# Patient Record
Sex: FEMALE | Race: WHITE | NOT HISPANIC OR LATINO | Employment: UNEMPLOYED | URBAN - METROPOLITAN AREA
[De-identification: names, ages, dates, MRNs, and addresses within clinical notes are randomized per-mention and may not be internally consistent; named-entity substitution may affect disease eponyms.]

---

## 2023-05-19 ENCOUNTER — HOSPITAL ENCOUNTER (EMERGENCY)
Facility: HOSPITAL | Age: 23
Discharge: HOME | End: 2023-05-19
Attending: EMERGENCY MEDICINE
Payer: OTHER GOVERNMENT

## 2023-05-19 VITALS
RESPIRATION RATE: 22 BRPM | HEART RATE: 88 BPM | OXYGEN SATURATION: 100 % | WEIGHT: 155 LBS | TEMPERATURE: 99 F | DIASTOLIC BLOOD PRESSURE: 59 MMHG | SYSTOLIC BLOOD PRESSURE: 104 MMHG

## 2023-05-19 DIAGNOSIS — K80.20 CALCULUS OF GALLBLADDER WITHOUT CHOLECYSTITIS WITHOUT OBSTRUCTION: Primary | ICD-10-CM

## 2023-05-19 DIAGNOSIS — R10.10 UPPER ABDOMINAL PAIN: ICD-10-CM

## 2023-05-19 LAB
ALBUMIN SERPL BCP-MCNC: 4.3 G/DL (ref 3.5–5.2)
ALP SERPL-CCNC: 108 U/L (ref 55–135)
ALT SERPL W/O P-5'-P-CCNC: 33 U/L (ref 10–44)
ANION GAP SERPL CALC-SCNC: 11 MMOL/L (ref 8–16)
AST SERPL-CCNC: 77 U/L (ref 10–40)
B-HCG UR QL: NEGATIVE
BASOPHILS # BLD AUTO: 0.03 K/UL (ref 0–0.2)
BASOPHILS NFR BLD: 0.2 % (ref 0–1.9)
BILIRUB SERPL-MCNC: 0.6 MG/DL (ref 0.1–1)
BILIRUB UR QL STRIP: NEGATIVE
BUN SERPL-MCNC: 15 MG/DL (ref 6–20)
CALCIUM SERPL-MCNC: 9.2 MG/DL (ref 8.7–10.5)
CHLORIDE SERPL-SCNC: 107 MMOL/L (ref 95–110)
CLARITY UR: CLEAR
CO2 SERPL-SCNC: 23 MMOL/L (ref 23–29)
COLOR UR: YELLOW
CREAT SERPL-MCNC: 0.9 MG/DL (ref 0.5–1.4)
CTP QC/QA: YES
DIFFERENTIAL METHOD: ABNORMAL
EOSINOPHIL # BLD AUTO: 0.1 K/UL (ref 0–0.5)
EOSINOPHIL NFR BLD: 0.7 % (ref 0–8)
ERYTHROCYTE [DISTWIDTH] IN BLOOD BY AUTOMATED COUNT: 12.9 % (ref 11.5–14.5)
EST. GFR  (NO RACE VARIABLE): >60 ML/MIN/1.73 M^2
GLUCOSE SERPL-MCNC: 97 MG/DL (ref 70–110)
GLUCOSE UR QL STRIP: NEGATIVE
HCT VFR BLD AUTO: 40 % (ref 37–48.5)
HGB BLD-MCNC: 13.2 G/DL (ref 12–16)
HGB UR QL STRIP: NEGATIVE
IMM GRANULOCYTES # BLD AUTO: 0.03 K/UL (ref 0–0.04)
IMM GRANULOCYTES NFR BLD AUTO: 0.2 % (ref 0–0.5)
KETONES UR QL STRIP: NEGATIVE
LEUKOCYTE ESTERASE UR QL STRIP: NEGATIVE
LIPASE SERPL-CCNC: 44 U/L (ref 4–60)
LYMPHOCYTES # BLD AUTO: 3.4 K/UL (ref 1–4.8)
LYMPHOCYTES NFR BLD: 27.3 % (ref 18–48)
MCH RBC QN AUTO: 28.9 PG (ref 27–31)
MCHC RBC AUTO-ENTMCNC: 33 G/DL (ref 32–36)
MCV RBC AUTO: 88 FL (ref 82–98)
MONOCYTES # BLD AUTO: 0.6 K/UL (ref 0.3–1)
MONOCYTES NFR BLD: 4.8 % (ref 4–15)
NEUTROPHILS # BLD AUTO: 8.2 K/UL (ref 1.8–7.7)
NEUTROPHILS NFR BLD: 66.8 % (ref 38–73)
NITRITE UR QL STRIP: NEGATIVE
NRBC BLD-RTO: 0 /100 WBC
PH UR STRIP: 8 [PH] (ref 5–8)
PLATELET # BLD AUTO: 237 K/UL (ref 150–450)
PMV BLD AUTO: 10.2 FL (ref 9.2–12.9)
POTASSIUM SERPL-SCNC: 3.3 MMOL/L (ref 3.5–5.1)
PROT SERPL-MCNC: 7.3 G/DL (ref 6–8.4)
PROT UR QL STRIP: ABNORMAL
RBC # BLD AUTO: 4.56 M/UL (ref 4–5.4)
SODIUM SERPL-SCNC: 141 MMOL/L (ref 136–145)
SP GR UR STRIP: 1.02 (ref 1–1.03)
URN SPEC COLLECT METH UR: ABNORMAL
UROBILINOGEN UR STRIP-ACNC: ABNORMAL EU/DL
WBC # BLD AUTO: 12.29 K/UL (ref 3.9–12.7)

## 2023-05-19 PROCEDURE — 80053 COMPREHEN METABOLIC PANEL: CPT | Performed by: NURSE PRACTITIONER

## 2023-05-19 PROCEDURE — 81003 URINALYSIS AUTO W/O SCOPE: CPT | Performed by: NURSE PRACTITIONER

## 2023-05-19 PROCEDURE — 83690 ASSAY OF LIPASE: CPT | Performed by: NURSE PRACTITIONER

## 2023-05-19 PROCEDURE — 81025 URINE PREGNANCY TEST: CPT | Performed by: NURSE PRACTITIONER

## 2023-05-19 PROCEDURE — 85025 COMPLETE CBC W/AUTO DIFF WBC: CPT | Performed by: NURSE PRACTITIONER

## 2023-05-19 PROCEDURE — 99284 EMERGENCY DEPT VISIT MOD MDM: CPT | Mod: 25

## 2023-05-19 PROCEDURE — 25000003 PHARM REV CODE 250: Performed by: NURSE PRACTITIONER

## 2023-05-19 RX ORDER — POTASSIUM CHLORIDE 20 MEQ/1
20 TABLET, EXTENDED RELEASE ORAL
Status: COMPLETED | OUTPATIENT
Start: 2023-05-19 | End: 2023-05-19

## 2023-05-19 RX ORDER — OXYCODONE AND ACETAMINOPHEN 5; 325 MG/1; MG/1
1 TABLET ORAL EVERY 6 HOURS PRN
Qty: 8 TABLET | Refills: 0 | Status: SHIPPED | OUTPATIENT
Start: 2023-05-19 | End: 2023-05-21

## 2023-05-19 RX ORDER — ONDANSETRON 4 MG/1
4 TABLET, ORALLY DISINTEGRATING ORAL EVERY 8 HOURS PRN
Qty: 9 TABLET | Refills: 0 | Status: SHIPPED | OUTPATIENT
Start: 2023-05-19 | End: 2023-05-22

## 2023-05-19 RX ADMIN — POTASSIUM CHLORIDE 20 MEQ: 1500 TABLET, EXTENDED RELEASE ORAL at 03:05

## 2023-05-19 NOTE — ED PROVIDER NOTES
Encounter Date: 5/19/2023    SCRIBE #1 NOTE: I, Steven Lin, am scribing for, and in the presence of,  Briana Condon NP. I have scribed the following portions of the note - Other sections scribed: HPI, ROS, Physical Exam.       Chief Complaint   Patient presents with    Abdominal Pain     Pt states that she woke up with abd pain and is nauseated.  Pt states that its all 4 quads that are hurting.      Carl Mills is a 23 y.o. female who presents to the ED with abdominal pain, onset 30 minutes ago. The patient reports she was laying in bed when the pain started suddenly and she became diaphoretic. Patient reports she could barely move due to the severity. She denies GI history. Last BM just prior to arrival. She denies N/V/D or fever.     The history is provided by the patient. No  was used.   Review of patient's allergies indicates:  Not on File  History reviewed. No pertinent past medical history.  History reviewed. No pertinent surgical history.  History reviewed. No pertinent family history.     Review of Systems   Constitutional:  Positive for diaphoresis.   Gastrointestinal:  Positive for abdominal pain. Negative for diarrhea, nausea and vomiting.   All other systems reviewed and are negative.    Physical Exam     Initial Vitals   BP Pulse Resp Temp SpO2   05/19/23 1429 05/19/23 1429 05/19/23 1429 05/19/23 1436 05/19/23 1429   (!) 104/59 88 (!) 22 98.6 °F (37 °C) 100 %      MAP       --                Physical Exam    Constitutional: She appears well-developed and well-nourished.   HENT:   Head: Normocephalic and atraumatic.   Eyes: Conjunctivae and EOM are normal. Pupils are equal, round, and reactive to light.   Neck: Neck supple.   Normal range of motion.  Cardiovascular:  Normal rate and regular rhythm.           Pulmonary/Chest: Breath sounds normal. No respiratory distress.   Abdominal: Abdomen is soft. There is abdominal tenderness in the epigastric area and periumbilical area.    No right CVA tenderness.  No left CVA tenderness. There is no rebound and no guarding.   Musculoskeletal:         General: Normal range of motion.      Cervical back: Normal range of motion and neck supple.     Neurological: She is alert and oriented to person, place, and time.   Skin: Skin is warm and dry.   Psychiatric: She has a normal mood and affect. Thought content normal.     ED Course   Procedures  Labs Reviewed   URINALYSIS, REFLEX TO URINE CULTURE - Abnormal; Notable for the following components:       Result Value    Protein, UA Trace (*)     Urobilinogen, UA 2.0-3.0 (*)     All other components within normal limits    Narrative:     Specimen Source->Urine   COMPREHENSIVE METABOLIC PANEL - Abnormal; Notable for the following components:    Potassium 3.3 (*)     AST 77 (*)     All other components within normal limits   CBC W/ AUTO DIFFERENTIAL - Abnormal; Notable for the following components:    Gran # (ANC) 8.2 (*)     All other components within normal limits   LIPASE   POCT URINE PREGNANCY          Imaging Results              US Abdomen Limited (Gallbladder) (Final result)  Result time 05/19/23 16:34:49      Final result by Gregory Cuellar MD (05/19/23 16:34:49)                   Impression:      Multiple gallstones.  No gallbladder wall thickening or pericholecystic fluid.  Negative sonographic Aldrich sign.      Electronically signed by: Gregory Cuellar MD  Date:    05/19/2023  Time:    16:34               Narrative:    EXAMINATION:  US ABDOMEN LIMITED    CLINICAL HISTORY:  Upper abdominal pain, unspecified    TECHNIQUE:  Limited ultrasound of the right upper quadrant of the abdomen (including pancreas, gallbladder, common bile duct, and right kidney) was performed.    COMPARISON:  None.    FINDINGS:  The visualized portions of the pancreas, abdominal aorta, and IVC are unremarkable.    Gallbladder: Multiple gallstones.  No gallbladder wall thickening or pericholecystic fluid..  Negative sonographic  Aldrich's sign.    Biliary system: The common duct is within normal limits, measuring 2 mm.  No intrahepatic ductal dilatation.    No right-sided hydronephrosis.                                       Medications   potassium chloride SA CR tablet 20 mEq (20 mEq Oral Given 5/19/23 1548)     Medical Decision Making:   Initial Assessment:   Carl Mills is a 23 y.o. female who presents to the ED with abdominal pain x 30 minutes.  Differential Diagnosis:   Differential Diagnosis includes, but is not limited to:  AAA, aortic dissection, mesenteric ischemia, perforated viscous, MI/ACS, SBO/volvulus, incarcerated/strangulated hernia, intussusception, ileus, appendicitis, cholecystitis, cholangitis, diverticulitis, esophagitis, hepatitis, nephrolithiasis, pancreatitis, gastroenteritis, colitis, IBD/IBS, biliary colic, GERD, PUD, constipation, UTI/pyelonephritis,  disorder.      Clinical Tests:   Lab Tests: Ordered and Reviewed  Radiological Study: Ordered and Reviewed        Scribe Attestation:   Scribe #1: I performed the above scribed service and the documentation accurately describes the services I performed. I attest to the accuracy of the note.      ED Course as of 05/19/23 1721   Fri May 19, 2023   1656 FINDINGS:  The visualized portions of the pancreas, abdominal aorta, and IVC are unremarkable.     Gallbladder: Multiple gallstones.  No gallbladder wall thickening or pericholecystic fluid..  Negative sonographic Aldrich's sign.     Biliary system: The common duct is within normal limits, measuring 2 mm.  No intrahepatic ductal dilatation.     No right-sided hydronephrosis.     Impression:     Multiple gallstones.  No gallbladder wall thickening or pericholecystic fluid.  Negative sonographic Aldrich sign [DT]   1656 CBC, chemistry, lipase, UPT, urinary analysis have been reviewed.  The potassium was slightly decreased at 3.3 therefore potassium supplement was given.  The ultrasound has been reviewed with multiple  gallstones.  There is no evidence of acute cholecystitis at this time.  Patient will be referred to General surgery and has been given strict return precautions. [DT]      ED Course User Index  [DT] Briana Condon NP                 Clinical Impression:   Final diagnoses:  [R10.10] Upper abdominal pain  [K80.20] Calculus of gallbladder without cholecystitis without obstruction (Primary)        ED Disposition Condition    Discharge Stable          ED Prescriptions       Medication Sig Dispense Start Date End Date Auth. Provider    oxyCODONE-acetaminophen (PERCOCET) 5-325 mg per tablet Take 1 tablet by mouth every 6 (six) hours as needed for Pain. 8 tablet 5/19/2023 5/21/2023 Briana Conodn NP    ondansetron (ZOFRAN-ODT) 4 MG TbDL Take 1 tablet (4 mg total) by mouth every 8 (eight) hours as needed (vomiting). 9 tablet 5/19/2023 5/22/2023 Briana Condon NP          Follow-up Information       Follow up With Specialties Details Why Contact Info    Marianna Perez MD General Surgery Schedule an appointment as soon as possible for a visit in 3 days  [address] [facility] [address]  [phone]               Briana Condon NP  05/19/23 5027

## 2023-05-19 NOTE — Clinical Note
"Carl "Annaliseiris" Gene was seen and treated in our emergency department on 5/19/2023.  She may return to work on 05/22/2023.       If you have any questions or concerns, please don't hesitate to call.      Briana Condon NP"

## 2023-05-19 NOTE — DISCHARGE INSTRUCTIONS

## 2023-05-19 NOTE — ED TRIAGE NOTES
Pt arrived with c/o sudden onset of RUQ and epigastric abd pain about 30 PTA.  Pt states she was just laying in bed at the onset of pain.  Pt reports pain is achy and cramping in nature.  Pt denies any fever.  +n/-v/-d.  Denies any urinary symptoms.  Pt answering questions appropriately, speaking in complete sentences, respirations even and unlabored.  Aao x 4.